# Patient Record
Sex: MALE | Race: WHITE | NOT HISPANIC OR LATINO | Employment: FULL TIME | URBAN - METROPOLITAN AREA
[De-identification: names, ages, dates, MRNs, and addresses within clinical notes are randomized per-mention and may not be internally consistent; named-entity substitution may affect disease eponyms.]

---

## 2024-09-23 ENCOUNTER — OFFICE VISIT (OUTPATIENT)
Dept: URGENT CARE | Facility: URGENT CARE | Age: 39
End: 2024-09-23
Payer: COMMERCIAL

## 2024-09-23 VITALS
HEART RATE: 69 BPM | SYSTOLIC BLOOD PRESSURE: 118 MMHG | WEIGHT: 232.7 LBS | DIASTOLIC BLOOD PRESSURE: 85 MMHG | TEMPERATURE: 97.4 F | RESPIRATION RATE: 18 BRPM | OXYGEN SATURATION: 97 %

## 2024-09-23 DIAGNOSIS — R07.0 THROAT PAIN: Primary | ICD-10-CM

## 2024-09-23 LAB
DEPRECATED S PYO AG THROAT QL EIA: NEGATIVE
GROUP A STREP BY PCR: NOT DETECTED

## 2024-09-23 PROCEDURE — 87651 STREP A DNA AMP PROBE: CPT | Performed by: FAMILY MEDICINE

## 2024-09-23 PROCEDURE — 99203 OFFICE O/P NEW LOW 30 MIN: CPT | Performed by: FAMILY MEDICINE

## 2024-09-23 NOTE — PROGRESS NOTES
Assessment & Plan     Throat pain  - Streptococcus A Rapid Screen w/Reflex to PCR  - Group A Streptococcus PCR Throat Swab     Continue symptomatic management for upper respiratory illness advised.  No evidence of peritonsillar abscess.  Strep PCR is pending but strep rapid test is negative.  Return as needed if symptoms worsen.        Landry Gamez MD   [facility]    Subjective     Mon[place] is a 39 year old male who presents to clinic today for the following health issues:  Chief Complaint   Patient presents with    Urgent Care     Woke up with throat pain that has gotten worse- slight cough and nausea  Pt request strep test- mentioned exposure at home from son being in school     HPI  Patient has seasonal allergies uncertain if he is having any congestion symptoms or is developing a cold similar to 2 of his other kids.  1 child tested positive for strep the middle child this past week.  He has not had any fevers.  Slight cough potentially mild nausea no body aches.  No difficulty opening mouth.  Still has his tonsils.    There are no problems to display for this patient.      No current outpatient medications on file.     No current facility-administered medications for this visit.           Objective    /85   Pulse 69   Temp 97.4  F (36.3  C) (Tympanic)   Resp 18   Wt 105.6 kg (232 lb 11.2 oz)   SpO2 97%   Physical Exam       No enlarged tonsils.  Lung exam clear bilaterally.  Heart rate regular rhythm in sinus  Gait: normal  GEN: NAD   Results for orders placed or performed in visit on 09/23/24   Streptococcus A Rapid Screen w/Reflex to PCR     Status: Normal    Specimen: Throat; Swab   Result Value Ref Range    Group A Strep antigen Negative Negative                     The use of Dragon/[id number] dictation services may have been used to construct the content in this note; any grammatical or spelling errors are non-intentional. Please contact the author of this note directly if you are  in need of any clarification.